# Patient Record
Sex: FEMALE | Race: OTHER | HISPANIC OR LATINO
[De-identification: names, ages, dates, MRNs, and addresses within clinical notes are randomized per-mention and may not be internally consistent; named-entity substitution may affect disease eponyms.]

---

## 2017-03-17 ENCOUNTER — APPOINTMENT (OUTPATIENT)
Dept: BARIATRICS | Facility: CLINIC | Age: 37
End: 2017-03-17

## 2017-06-14 ENCOUNTER — APPOINTMENT (OUTPATIENT)
Dept: BARIATRICS | Facility: CLINIC | Age: 37
End: 2017-06-14

## 2017-07-05 ENCOUNTER — APPOINTMENT (OUTPATIENT)
Dept: BARIATRICS | Facility: CLINIC | Age: 37
End: 2017-07-05

## 2017-07-12 ENCOUNTER — APPOINTMENT (OUTPATIENT)
Dept: BARIATRICS | Facility: CLINIC | Age: 37
End: 2017-07-12

## 2018-01-18 ENCOUNTER — MESSAGE (OUTPATIENT)
Age: 38
End: 2018-01-18

## 2018-02-09 ENCOUNTER — APPOINTMENT (OUTPATIENT)
Dept: BARIATRICS | Facility: CLINIC | Age: 38
End: 2018-02-09

## 2018-12-31 ENCOUNTER — MESSAGE (OUTPATIENT)
Age: 38
End: 2018-12-31

## 2019-03-01 ENCOUNTER — RESULT REVIEW (OUTPATIENT)
Age: 39
End: 2019-03-01

## 2020-01-23 ENCOUNTER — MESSAGE (OUTPATIENT)
Age: 40
End: 2020-01-23

## 2022-08-03 ENCOUNTER — APPOINTMENT (OUTPATIENT)
Dept: BARIATRICS | Facility: CLINIC | Age: 42
End: 2022-08-03

## 2022-08-03 VITALS
HEIGHT: 65 IN | BODY MASS INDEX: 32.32 KG/M2 | DIASTOLIC BLOOD PRESSURE: 73 MMHG | OXYGEN SATURATION: 98 % | TEMPERATURE: 97.2 F | HEART RATE: 50 BPM | SYSTOLIC BLOOD PRESSURE: 111 MMHG | WEIGHT: 194 LBS

## 2022-08-03 DIAGNOSIS — E66.01 MORBID (SEVERE) OBESITY DUE TO EXCESS CALORIES: ICD-10-CM

## 2022-08-03 PROCEDURE — 99214 OFFICE O/P EST MOD 30 MIN: CPT

## 2022-08-03 NOTE — PLAN
[FreeTextEntry1] : Patient is a 41 year old F with PMHx obesity and s/p lap VSG 06/27/2016 w/  who is here today for initial consult for a conversion surgery. Emphasized the importance of a healthy diet and an active lifestyle. With her BMI of 32 and no other comorbidity, I believe a surgical approach might not be necessary at this moment. Will refer the patient to the medical weight loss team. Have discussed the patient about the medical induced weightloss. \par \par -Referred the patient to the medical weight loss team.

## 2022-08-03 NOTE — END OF VISIT
[FreeTextEntry3] : All medical record entries made by the Scribe were at my, HODAN Jones , direction and personally dictated by me on 08/03/2022 . I have reviewed the chart and agree that the record accurately reflects my personal performance of the history, physical exam, assessment and plan. I have also personally directed, reviewed, and agreed with the chart.\par

## 2022-08-03 NOTE — ADDENDUM
[FreeTextEntry1] : Documented by Jamshid Prakash acting as a scribe for HODAN Jones on 08/03/2022\par

## 2022-08-03 NOTE — HISTORY OF PRESENT ILLNESS
[de-identified] : Patient is a 41 year old F with PMHx obesity and s/p lap VSG 06/27/2016 w/  who is here today for initial consult for a conversion surgery. Reports that she gained weight after the pandemic. She inquired about lap band. Denies heartburn. C/o difficulty sleeping.

## 2023-01-31 ENCOUNTER — APPOINTMENT (OUTPATIENT)
Dept: BARIATRICS/WEIGHT MGMT | Facility: CLINIC | Age: 43
End: 2023-01-31

## 2024-07-24 NOTE — PHYSICAL EXAM
[Normal] : well developed, well nourished, in no acute distress Quality 226: Preventive Care And Screening: Tobacco Use: Screening And Cessation Intervention: Patient screened for tobacco use and is an ex/non-smoker [Obese] : obese Detail Level: Detailed Quality 47: Advance Care Plan: Advance Care Planning discussed and documented; advance care plan or surrogate decision maker documented in the medical record.